# Patient Record
Sex: MALE | Race: WHITE | Employment: FULL TIME | URBAN - METROPOLITAN AREA
[De-identification: names, ages, dates, MRNs, and addresses within clinical notes are randomized per-mention and may not be internally consistent; named-entity substitution may affect disease eponyms.]

---

## 2022-09-08 ENCOUNTER — HOSPITAL ENCOUNTER (EMERGENCY)
Age: 27
Discharge: HOME | End: 2022-09-08
Attending: EMERGENCY MEDICINE

## 2022-09-08 VITALS
BODY MASS INDEX: 26.04 KG/M2 | TEMPERATURE: 98.7 F | WEIGHT: 186 LBS | DIASTOLIC BLOOD PRESSURE: 80 MMHG | OXYGEN SATURATION: 100 % | HEART RATE: 79 BPM | HEIGHT: 71 IN | RESPIRATION RATE: 18 BRPM | SYSTOLIC BLOOD PRESSURE: 130 MMHG

## 2022-09-08 DIAGNOSIS — F41.1 ANXIETY STATE: Primary | ICD-10-CM

## 2022-09-08 PROCEDURE — 99283 EMERGENCY DEPT VISIT LOW MDM: CPT

## 2022-09-08 RX ORDER — HYDROXYZINE PAMOATE 25 MG/1
25 CAPSULE ORAL 3 TIMES DAILY PRN
Qty: 28 CAPSULE | Refills: 0 | Status: SHIPPED | OUTPATIENT
Start: 2022-09-08 | End: 2022-09-22

## 2022-09-08 RX ORDER — HYDROXYZINE PAMOATE 25 MG/1
50 CAPSULE ORAL
Status: DISCONTINUED | OUTPATIENT
Start: 2022-09-08 | End: 2022-09-09 | Stop reason: HOSPADM

## 2022-09-08 ASSESSMENT — PAIN - FUNCTIONAL ASSESSMENT: PAIN_FUNCTIONAL_ASSESSMENT: NONE - DENIES PAIN

## 2022-09-08 ASSESSMENT — ENCOUNTER SYMPTOMS
COUGH: 0
BACK PAIN: 0
ABDOMINAL DISTENTION: 0

## 2022-09-09 NOTE — ED NOTES
I have reviewed discharge instructions with the patient. The patient verbalized understanding. Patient left ED via Discharge Method: ambulatory to Home with friend    Opportunity for questions and clarification provided. Patient given 1 scripts. To continue your aftercare when you leave the hospital, you may receive an automated call from our care team to check in on how you are doing. This is a free service and part of our promise to provide the best care and service to meet your aftercare needs.  If you have questions, or wish to unsubscribe from this service please call [phone]. Thank you for Choosing our [facility] Emergency Department.         Princess Massey RN  09/08/22 [id number]

## 2022-09-09 NOTE — ED TRIAGE NOTES
Pt arrives from home via POV.   States he has felt like his heart is not beating normal.  Has been under a lot of stress recently

## 2022-09-09 NOTE — ED PROVIDER NOTES
[employer] Emergency Department Provider Note                   PCP:                No primary care provider on file. Age: 27 y.o. Sex: male       ICD-10-CM    1. Anxiety state  F41.1           DISPOSITION Decision To Discharge 09/08/2022 10:26:10 PM         No orders of the defined types were placed in this encounter. Vinod Reveles is a 27 y.o. male who presents to the Emergency Department with chief complaint of    Chief Complaint   Patient presents with    Anxiety      41-year-old male with history of anxiety presents this department with chief complaint of worsening anxiety. States he feels like his heart is not beating normally. Reports increased stress lately. He denies any illicit drug use. States today he had a dream where he felt like something was grabbing and squeezing them. He is like he is having irregular heart rate all day today. While awaiting to be states his symptoms have improved. Not associate with any chest pain, shortness of breath, fever, chills, nausea vomit diarrhea. The history is provided by the patient. No  was used. Review of Systems   Constitutional:  Negative for chills and fever. HENT:  Negative for dental problem. Respiratory:  Negative for cough. Cardiovascular:  Positive for palpitations. Gastrointestinal:  Negative for abdominal distention. Musculoskeletal:  Negative for back pain. Neurological:  Negative for headaches. All other systems reviewed and are negative. History reviewed. No pertinent past medical history. History reviewed. No pertinent surgical history. History reviewed. No pertinent family history. Bactrim [sulfamethoxazole-trimethoprim]     Previous Medications    No medications on file        Vitals signs and nursing note reviewed.    Patient Vitals for the past 4 hrs:   Temp Pulse Resp BP SpO2   09/08/22 2128 98.7 °F (37.1 °C) 79 18 (!) 129/96 99 %          Physical

## 2023-01-17 ENCOUNTER — APPOINTMENT (OUTPATIENT)
Dept: GENERAL RADIOLOGY | Age: 28
End: 2023-01-17

## 2023-01-17 ENCOUNTER — HOSPITAL ENCOUNTER (EMERGENCY)
Age: 28
Discharge: HOME | End: 2023-01-17
Attending: EMERGENCY MEDICINE

## 2023-01-17 VITALS
HEART RATE: 68 BPM | TEMPERATURE: 98.1 F | DIASTOLIC BLOOD PRESSURE: 87 MMHG | RESPIRATION RATE: 17 BRPM | OXYGEN SATURATION: 99 % | SYSTOLIC BLOOD PRESSURE: 129 MMHG

## 2023-01-17 DIAGNOSIS — F41.1 ANXIETY STATE: Primary | ICD-10-CM

## 2023-01-17 LAB
ALBUMIN SERPL-MCNC: 4.8 G/DL (ref 3.5–5)
ALBUMIN/GLOB SERPL: 1.3 (ref 0.4–1.6)
ALP SERPL-CCNC: 64 U/L (ref 50–136)
ALT SERPL-CCNC: 39 U/L (ref 12–65)
ANION GAP SERPL CALC-SCNC: 8 MMOL/L (ref 2–11)
AST SERPL-CCNC: 21 U/L (ref 15–37)
BILIRUB SERPL-MCNC: 2.3 MG/DL (ref 0.2–1.1)
BUN SERPL-MCNC: 15 MG/DL (ref 6–23)
CALCIUM SERPL-MCNC: 9.7 MG/DL (ref 8.3–10.4)
CHLORIDE SERPL-SCNC: 104 MMOL/L (ref 101–110)
CO2 SERPL-SCNC: 26 MMOL/L (ref 21–32)
CREAT SERPL-MCNC: 1 MG/DL (ref 0.8–1.5)
ERYTHROCYTE [DISTWIDTH] IN BLOOD BY AUTOMATED COUNT: 12.2 % (ref 11.9–14.6)
GLOBULIN SER CALC-MCNC: 3.8 G/DL (ref 2.8–4.5)
GLUCOSE SERPL-MCNC: 88 MG/DL (ref 65–100)
HCT VFR BLD AUTO: 48.2 % (ref 41.1–50.3)
HGB BLD-MCNC: 16.4 G/DL (ref 13.6–17.2)
MAGNESIUM SERPL-MCNC: 2.1 MG/DL (ref 1.8–2.4)
MCH RBC QN AUTO: 29.6 PG (ref 26.1–32.9)
MCHC RBC AUTO-ENTMCNC: 34 G/DL (ref 31.4–35)
MCV RBC AUTO: 87 FL (ref 82–102)
NRBC # BLD: 0 K/UL (ref 0–0.2)
PLATELET # BLD AUTO: 235 K/UL (ref 150–450)
PMV BLD AUTO: 11.5 FL (ref 9.4–12.3)
POTASSIUM SERPL-SCNC: 3.6 MMOL/L (ref 3.5–5.1)
PROT SERPL-MCNC: 8.6 G/DL (ref 6.3–8.2)
RBC # BLD AUTO: 5.54 M/UL (ref 4.23–5.6)
SODIUM SERPL-SCNC: 138 MMOL/L (ref 133–143)
TROPONIN I SERPL HS-MCNC: 5.1 PG/ML (ref 0–14)
WBC # BLD AUTO: 11.4 K/UL (ref 4.3–11.1)

## 2023-01-17 PROCEDURE — 83735 ASSAY OF MAGNESIUM: CPT

## 2023-01-17 PROCEDURE — 80053 COMPREHEN METABOLIC PANEL: CPT

## 2023-01-17 PROCEDURE — 71046 X-RAY EXAM CHEST 2 VIEWS: CPT

## 2023-01-17 PROCEDURE — 6370000000 HC RX 637 (ALT 250 FOR IP)

## 2023-01-17 PROCEDURE — 2580000003 HC RX 258

## 2023-01-17 PROCEDURE — 99285 EMERGENCY DEPT VISIT HI MDM: CPT

## 2023-01-17 PROCEDURE — 85027 COMPLETE CBC AUTOMATED: CPT

## 2023-01-17 PROCEDURE — 93005 ELECTROCARDIOGRAM TRACING: CPT

## 2023-01-17 PROCEDURE — 84484 ASSAY OF TROPONIN QUANT: CPT

## 2023-01-17 RX ORDER — HYDROXYZINE HYDROCHLORIDE 25 MG/1
25 TABLET, FILM COATED ORAL EVERY 8 HOURS PRN
Qty: 30 TABLET | Refills: 0 | Status: SHIPPED | OUTPATIENT
Start: 2023-01-17

## 2023-01-17 RX ORDER — LORAZEPAM 1 MG/1
1 TABLET ORAL ONCE
Status: COMPLETED | OUTPATIENT
Start: 2023-01-17 | End: 2023-01-17

## 2023-01-17 RX ORDER — 0.9 % SODIUM CHLORIDE 0.9 %
1000 INTRAVENOUS SOLUTION INTRAVENOUS ONCE
Status: COMPLETED | OUTPATIENT
Start: 2023-01-17 | End: 2023-01-17

## 2023-01-17 RX ADMIN — SODIUM CHLORIDE 1000 ML: 9 INJECTION, SOLUTION INTRAVENOUS at 22:55

## 2023-01-17 RX ADMIN — LORAZEPAM 1 MG: 1 TABLET ORAL at 22:55

## 2023-01-17 ASSESSMENT — ENCOUNTER SYMPTOMS
CHEST TIGHTNESS: 1
SHORTNESS OF BREATH: 1

## 2023-01-18 LAB
EKG ATRIAL RATE: 89 BPM
EKG DIAGNOSIS: NORMAL
EKG P AXIS: 61 DEGREES
EKG P-R INTERVAL: 122 MS
EKG Q-T INTERVAL: 360 MS
EKG QRS DURATION: 74 MS
EKG QTC CALCULATION (BAZETT): 438 MS
EKG R AXIS: 35 DEGREES
EKG T AXIS: 46 DEGREES
EKG VENTRICULAR RATE: 89 BPM

## 2023-01-18 NOTE — ED NOTES
I have reviewed discharge instructions with the patient and parent. The patient and parent verbalized understanding. Patient left ED via Discharge Method: ambulatory to Home with (mother). Opportunity for questions and clarification provided. Patient given 1 scripts. To continue your aftercare when you leave the hospital, you may receive an automated call from our care team to check in on how you are doing. This is a free service and part of our promise to provide the best care and service to meet your aftercare needs.  If you have questions, or wish to unsubscribe from this service please call [phone]. Thank you for Choosing our [facility] Emergency Department.        Gaby Desouza RN  01/17/23 [id number]

## 2023-01-18 NOTE — DISCHARGE INSTRUCTIONS
Please use Atarax as needed for anxiety. Please follow-up with primary care. You may use the number listed below to find a primary care doctor. We would love to help you get a primary care doctor for follow-up after your emergency department visit. Please call [phone] between 7AM - 6PM Monday to Friday. A care navigator will be able to assist you with setting up a doctor close to your home.

## 2023-01-18 NOTE — ED PROVIDER NOTES
Emergency Department Provider Note                   PCP:                None Provider               Age: 32 y.o. Sex: male       ICD-10-CM    1. Anxiety state  F41.1           DISPOSITION Decision To Discharge 01/17/2023 10:52:49 PM       Medical Decision Making  77-year-old male presents with self-admitted panic attacks related to his dog being ill. Does endorse chest tightness and palpitations with these episodes. We will check cardiac work-up to include CBC, CMP, troponin, chest x-ray. Problems Addressed:  Anxiety state: acute illness or injury    Amount and/or Complexity of Data Reviewed  External Data Reviewed: labs, radiology and ECG. Labs: ordered. Radiology: ordered. ECG/medicine tests: ordered. Risk  Prescription drug management. Complexity of Problem: 1 acute, uncomplicated illness or injury. (3)    I have conducted an independent ordering and review of Labs. I have conducted an independent ordering and review of EKG. I have conducted an independent ordering and review of X-rays. Patient was discharged risks and benefits of hospitalization were discussed. ED Course as of 01/17/23 2352   Tue Jan 17, 2023 2222 CXR:  IMPRESSION:  No evidence of an acute intrathoracic process [CJ]   2252 CBC, CMP, mag, troponin normal plan to discharge patient with PRN atarax.   [CJ]      ED Course User Index  [CJ] Rene Fraire, ROSANA - CNP        Orders Placed This Encounter   Procedures    XR CHEST (2 VW)    CBC    Comprehensive Metabolic Panel    Troponin    Magnesium    Pulse Oximetry    EKG 12 Lead    Saline lock IV        Medications   0.9 % sodium chloride bolus (0 mLs IntraVENous Stopped 1/17/23 2463)   LORazepam (ATIVAN) tablet 1 mg (1 mg Oral Given 1/17/23 2255)       Discharge Medication List as of 1/17/2023 11:18 PM        START taking these medications    Details   hydrOXYzine HCl (ATARAX) 25 MG tablet Take 1 tablet by mouth every 8 hours as needed for Anxiety, Disp-30 tablet, R-0Print              Perry Thornton is a 32 y.o. male who presents to the Emergency Department with chief complaint of    Chief Complaint   Patient presents with    Anxiety      64-year-old male with no past medical history presents for several episodes of chest tightness with palpitations. He feels this is related to his dog recently being ill. His mother endorses several panic attacks over the last several months and weeks. States that she struggles with anxiety and he often has some a similar symptoms that she knows her self. He has been seen in the past for anxiety and was prescribed Atarax, states that he took it as needed and felt that it is at first however feels that the medication became ineffective and then he ran out. Denies suicidal or homicidal ideation. Does not have primary care. Endorse using alcohol most days up to 5 drinks a day. The history is provided by the patient. Review of Systems   Respiratory:  Positive for chest tightness and shortness of breath. Cardiovascular:  Positive for palpitations. Psychiatric/Behavioral:  The patient is nervous/anxious. All other systems reviewed and are negative. No past medical history on file. No past surgical history on file. No family history on file. Social History     Socioeconomic History    Marital status: Single        Allergies: Bactrim [sulfamethoxazole-trimethoprim]    Discharge Medication List as of 1/17/2023 11:18 PM           Vitals signs and nursing note reviewed. Patient Vitals for the past 4 hrs:   Temp Pulse Resp BP SpO2   01/17/23 2330 98.1 °F (36.7 °C) 68 17 129/87 99 %          Physical Exam  Vitals and nursing note reviewed. Constitutional:       General: He is not in acute distress. Appearance: Normal appearance. He is normal weight. He is not ill-appearing or toxic-appearing. HENT:      Head: Normocephalic and atraumatic.       Right Ear: External ear normal.      Left Ear: External ear normal.      Nose: Nose normal. No congestion or rhinorrhea. Mouth/Throat:      Mouth: Mucous membranes are moist.      Pharynx: No oropharyngeal exudate or posterior oropharyngeal erythema. Eyes:      Extraocular Movements: Extraocular movements intact. Pupils: Pupils are equal, round, and reactive to light. Neck:      Vascular: No carotid bruit. Cardiovascular:      Rate and Rhythm: Normal rate and regular rhythm. Pulses: Normal pulses. Heart sounds: Normal heart sounds. No murmur heard. No friction rub. No gallop. Pulmonary:      Effort: Pulmonary effort is normal. No respiratory distress. Breath sounds: Normal breath sounds. No stridor. No wheezing, rhonchi or rales. Chest:      Chest wall: No tenderness. Abdominal:      General: Abdomen is flat. Palpations: Abdomen is soft. Musculoskeletal:         General: No swelling, tenderness or deformity. Normal range of motion. Cervical back: Normal range of motion and neck supple. No rigidity or tenderness. Lymphadenopathy:      Cervical: No cervical adenopathy. Skin:     General: Skin is warm. Capillary Refill: Capillary refill takes less than 2 seconds. Coloration: Skin is not jaundiced or pale. Findings: No bruising, erythema, lesion or rash. Neurological:      General: No focal deficit present. Mental Status: He is alert and oriented to person, place, and time. Mental status is at baseline. Cranial Nerves: No cranial nerve deficit. Sensory: No sensory deficit. Motor: No weakness. Coordination: Coordination normal.      Gait: Gait normal.   Psychiatric:         Mood and Affect: Mood normal.         Behavior: Behavior normal.         Thought Content:  Thought content normal.         Judgment: Judgment normal.        Procedures    ED EKG Interpretation  EKG was interpreted in the absence of a cardiologist.    Rate: 89  EKG Interpretation: EKG Interpretation: sinus rhythm  ST Segments: Normal ST segments - NO STEMI    Results for orders placed or performed during the hospital encounter of 01/17/23   XR CHEST (2 VW)    Narrative    EXAM: XR CHEST (2 VW)    HISTORY: Chest Pain. TECHNIQUE: Frontal and lateral chest.    COMPARISON: None available. FINDINGS:   The cardiac silhouette, mediastinum, and pulmonary vasculature are within normal  limits. There is no consolidation, pleural effusion, or pneumothorax. No significant osseous abnormalities are observed. Impression    No evidence of an acute intrathoracic process.      CBC   Result Value Ref Range    WBC 11.4 (H) 4.3 - 11.1 K/uL    RBC 5.54 4.23 - 5.6 M/uL    Hemoglobin 16.4 13.6 - 17.2 g/dL    Hematocrit 48.2 41.1 - 50.3 %    MCV 87.0 82 - 102 FL    MCH 29.6 26.1 - 32.9 PG    MCHC 34.0 31.4 - 35.0 g/dL    RDW 12.2 11.9 - 14.6 %    Platelets 292 245 - 491 K/uL    MPV 11.5 9.4 - 12.3 FL    nRBC 0.00 0.0 - 0.2 K/uL   Comprehensive Metabolic Panel   Result Value Ref Range    Sodium 138 133 - 143 mmol/L    Potassium 3.6 3.5 - 5.1 mmol/L    Chloride 104 101 - 110 mmol/L    CO2 26 21 - 32 mmol/L    Anion Gap 8 2 - 11 mmol/L    Glucose 88 65 - 100 mg/dL    BUN 15 6 - 23 MG/DL    Creatinine 1.00 0.8 - 1.5 MG/DL    Est, Glom Filt Rate >60 >60 ml/min/1.73m2    Calcium 9.7 8.3 - 10.4 MG/DL    Total Bilirubin 2.3 (H) 0.2 - 1.1 MG/DL    ALT 39 12 - 65 U/L    AST 21 15 - 37 U/L    Alk Phosphatase 64 50 - 136 U/L    Total Protein 8.6 (H) 6.3 - 8.2 g/dL    Albumin 4.8 3.5 - 5.0 g/dL    Globulin 3.8 2.8 - 4.5 g/dL    Albumin/Globulin Ratio 1.3 0.4 - 1.6     Troponin   Result Value Ref Range    Troponin, High Sensitivity 5.1 0 - 14 pg/mL   Magnesium   Result Value Ref Range    Magnesium 2.1 1.8 - 2.4 mg/dL   EKG 12 Lead   Result Value Ref Range    Ventricular Rate 89 BPM    Atrial Rate 89 BPM    P-R Interval 122 ms    QRS Duration 74 ms    Q-T Interval 360 ms    QTc Calculation (Bazett) 438 ms    P Axis 61 degrees    R Axis 35 degrees    T Axis 46 degrees    Diagnosis Normal sinus rhythm         XR CHEST (2 VW)   Final Result   No evidence of an acute intrathoracic process. Voice dictation software was used during the making of this note. This software is not perfect and grammatical and other typographical errors may be present. This note has not been completely proofread for errors.      Shon Riedel, ROSANA - CNP  01/17/23 0190

## 2023-01-18 NOTE — ED TRIAGE NOTES
Ambulatory with steady gait into triage. Accompanied by mother. Reports anxiety r/t sick dog. Onset of symptoms couple days ago when taking dog to vet. Reports hx of similar symptoms in past r/t stress.  Denies chest pain, n/v/d.

## 2024-11-14 ENCOUNTER — HOSPITAL ENCOUNTER (EMERGENCY)
Age: 29
Discharge: HOME | End: 2024-11-15
Attending: EMERGENCY MEDICINE

## 2024-11-14 DIAGNOSIS — F41.9 ANXIETY: ICD-10-CM

## 2024-11-14 DIAGNOSIS — R00.2 PALPITATIONS: Primary | ICD-10-CM

## 2024-11-14 DIAGNOSIS — R07.89 CHEST DISCOMFORT: ICD-10-CM

## 2024-11-14 LAB
BASOPHILS # BLD: 0.1 K/UL (ref 0–0.2)
BASOPHILS NFR BLD: 0 % (ref 0–2)
DIFFERENTIAL METHOD BLD: ABNORMAL
EOSINOPHIL # BLD: 0 K/UL (ref 0–0.8)
EOSINOPHIL NFR BLD: 0 % (ref 0.5–7.8)
ERYTHROCYTE [DISTWIDTH] IN BLOOD BY AUTOMATED COUNT: 12.4 % (ref 11.9–14.6)
HCT VFR BLD AUTO: 43.2 % (ref 41.1–50.3)
HGB BLD-MCNC: 14.9 G/DL (ref 13.6–17.2)
IMM GRANULOCYTES # BLD AUTO: 0 K/UL (ref 0–0.5)
IMM GRANULOCYTES NFR BLD AUTO: 0 % (ref 0–5)
LYMPHOCYTES # BLD: 1.1 K/UL (ref 0.5–4.6)
LYMPHOCYTES NFR BLD: 8 % (ref 13–44)
MCH RBC QN AUTO: 29.2 PG (ref 26.1–32.9)
MCHC RBC AUTO-ENTMCNC: 34.5 G/DL (ref 31.4–35)
MCV RBC AUTO: 84.7 FL (ref 82–102)
MONOCYTES # BLD: 0.5 K/UL (ref 0.1–1.3)
MONOCYTES NFR BLD: 4 % (ref 4–12)
NEUTS SEG # BLD: 11.4 K/UL (ref 1.7–8.2)
NEUTS SEG NFR BLD: 88 % (ref 43–78)
NRBC # BLD: 0 K/UL (ref 0–0.2)
PLATELET # BLD AUTO: 215 K/UL (ref 150–450)
PMV BLD AUTO: 11.7 FL (ref 9.4–12.3)
RBC # BLD AUTO: 5.1 M/UL (ref 4.23–5.6)
WBC # BLD AUTO: 13.1 K/UL (ref 4.3–11.1)

## 2024-11-14 PROCEDURE — 83735 ASSAY OF MAGNESIUM: CPT

## 2024-11-14 PROCEDURE — 85025 COMPLETE CBC W/AUTO DIFF WBC: CPT

## 2024-11-14 PROCEDURE — 84484 ASSAY OF TROPONIN QUANT: CPT

## 2024-11-14 PROCEDURE — 80053 COMPREHEN METABOLIC PANEL: CPT

## 2024-11-14 PROCEDURE — 99284 EMERGENCY DEPT VISIT MOD MDM: CPT

## 2024-11-14 PROCEDURE — 93005 ELECTROCARDIOGRAM TRACING: CPT | Performed by: EMERGENCY MEDICINE

## 2024-11-14 ASSESSMENT — LIFESTYLE VARIABLES
HOW MANY STANDARD DRINKS CONTAINING ALCOHOL DO YOU HAVE ON A TYPICAL DAY: 1 OR 2
HOW OFTEN DO YOU HAVE A DRINK CONTAINING ALCOHOL: MONTHLY OR LESS

## 2024-11-14 ASSESSMENT — PAIN SCALES - GENERAL: PAINLEVEL_OUTOF10: 0

## 2024-11-15 VITALS
DIASTOLIC BLOOD PRESSURE: 75 MMHG | SYSTOLIC BLOOD PRESSURE: 118 MMHG | RESPIRATION RATE: 18 BRPM | OXYGEN SATURATION: 98 % | TEMPERATURE: 98 F | HEIGHT: 71 IN | BODY MASS INDEX: 23.8 KG/M2 | WEIGHT: 170 LBS | HEART RATE: 61 BPM

## 2024-11-15 LAB
ALBUMIN SERPL-MCNC: 4.3 G/DL (ref 3.5–5)
ALBUMIN/GLOB SERPL: 1.3 (ref 1–1.9)
ALP SERPL-CCNC: 62 U/L (ref 40–129)
ALT SERPL-CCNC: 31 U/L (ref 8–55)
ANION GAP SERPL CALC-SCNC: 11 MMOL/L (ref 7–16)
AST SERPL-CCNC: 26 U/L (ref 15–37)
BILIRUB SERPL-MCNC: 0.5 MG/DL (ref 0–1.2)
BILIRUB UR QL: NEGATIVE
BUN SERPL-MCNC: 17 MG/DL (ref 6–23)
CALCIUM SERPL-MCNC: 9.4 MG/DL (ref 8.8–10.2)
CHLORIDE SERPL-SCNC: 104 MMOL/L (ref 98–107)
CO2 SERPL-SCNC: 26 MMOL/L (ref 20–29)
CREAT SERPL-MCNC: 1.07 MG/DL (ref 0.8–1.3)
EKG ATRIAL RATE: 66 BPM
EKG DIAGNOSIS: NORMAL
EKG P AXIS: 56 DEGREES
EKG P-R INTERVAL: 128 MS
EKG Q-T INTERVAL: 384 MS
EKG QRS DURATION: 76 MS
EKG QTC CALCULATION (BAZETT): 402 MS
EKG R AXIS: 45 DEGREES
EKG T AXIS: 43 DEGREES
EKG VENTRICULAR RATE: 66 BPM
GLOBULIN SER CALC-MCNC: 3.3 G/DL (ref 2.3–3.5)
GLUCOSE SERPL-MCNC: 129 MG/DL (ref 70–99)
GLUCOSE UR QL STRIP.AUTO: NEGATIVE MG/DL
KETONES UR-MCNC: NEGATIVE MG/DL
LEUKOCYTE ESTERASE UR QL STRIP: NEGATIVE
MAGNESIUM SERPL-MCNC: 2.1 MG/DL (ref 1.8–2.4)
NITRITE UR QL: NEGATIVE
PH UR: 7 (ref 5–9)
POTASSIUM SERPL-SCNC: 4.3 MMOL/L (ref 3.5–5.1)
PROT SERPL-MCNC: 7.6 G/DL (ref 6.3–8.2)
PROT UR QL: ABNORMAL MG/DL
RBC # UR STRIP: NEGATIVE
SERVICE CMNT-IMP: ABNORMAL
SODIUM SERPL-SCNC: 140 MMOL/L (ref 136–145)
SP GR UR: 1.02 (ref 1–1.02)
TROPONIN T SERPL HS-MCNC: <6 NG/L (ref 0–22)
UROBILINOGEN UR QL: 1 EU/DL (ref 0.2–1)

## 2024-11-15 PROCEDURE — 81003 URINALYSIS AUTO W/O SCOPE: CPT

## 2024-11-15 PROCEDURE — 93010 ELECTROCARDIOGRAM REPORT: CPT | Performed by: INTERNAL MEDICINE

## 2024-11-15 RX ORDER — BUSPIRONE HYDROCHLORIDE 7.5 MG/1
7.5 TABLET ORAL 2 TIMES DAILY
Qty: 60 TABLET | Refills: 0 | Status: SHIPPED | OUTPATIENT
Start: 2024-11-15 | End: 2024-12-15

## 2024-11-15 RX ORDER — HYDROXYZINE HYDROCHLORIDE 25 MG/1
25 TABLET, FILM COATED ORAL EVERY 8 HOURS PRN
Qty: 30 TABLET | Refills: 0 | Status: SHIPPED | OUTPATIENT
Start: 2024-11-15

## 2024-11-15 ASSESSMENT — PAIN SCALES - GENERAL: PAINLEVEL_OUTOF10: 0

## 2024-11-15 NOTE — ED TRIAGE NOTES
Pt states he had a panic attack after eating this evening, states he had a \"scared feeling\".  Pt has a hx of panic attacks, states he doesn't take anything for these.

## 2024-11-15 NOTE — ED NOTES
Patient mobility status  with no difficulty.     I have reviewed discharge instructions with the patient.  The patient verbalized understanding.    Patient left ED via Discharge Method: ambulatory to Home with Parent.    Opportunity for questions and clarification provided.     Patient given 2 scripts.            Evelina Young RN  11/15/24 0158

## 2024-11-15 NOTE — ED PROVIDER NOTES
Emergency Department Provider Note                   PCP:                Jeri Vale APRN - NP               Age: 28 y.o.      Sex: male   Final diagnosis/impression:  1. Palpitations    2. Anxiety    3. Chest discomfort       Disposition: Discharged    MDM/Clinical Course:  Patient seen by myself at the [facility] emergency department. Patient had signs symptoms and clinical history most consistent withWith what sounds like anxiety/panic attack type symptoms, however we will proceed with additional workup for palpitations, chest discomfort.  Overall well-appearing, no current ongoing symptoms. My independent analysis/interpretation of laboratory work-up here shows CBC shows mild leukocytosis of 13.1 of unclear etiology/significance.  Suspect this could potentially be stress or dehydration related.  CMP is unremarkable but does show mildly elevated glucose at 129, suspect stress related.  Urinalysis performed shows elevated urine specific gravity at 1.025, no glucosuria.  Magnesium is unremarkable.  Troponin testing is unremarkable.  Outpatient prescriptions for hydroxyzine, BuSpar written.  I did recommend follow-up with [facility] clinic.  I did recommend avoidance of any type of caffeinated beverages, stimulant, and similar.  I recommended close monitoring of symptoms, low threshold to return as needed.  Patient/family given instructions to return as needed for any questions, concerns or worsening symptoms, particularly those as outlined in the disposition section / discharge section of patient discharge paperwork. Patient/family verbalizes understanding and agreement with ED course/plan in shared medical decision making. Questions answered.    Complexity of Problems Addressed:  1 or more acute illnesses that pose a threat to life or bodily function.     Data Reviewed and Analyzed:    Category 1:   I ordered each unique test.  I reviewed the results of each unique test.    Category 2:   My

## 2024-11-15 NOTE — DISCHARGE INSTRUCTIONS
Please return for any questions, concerns or worsening symptoms, particularly increasing/unremitting chest pain, fainting episodes, heart palpitations, lightheadedness, shortness of breath.    We do recommend follow-up with a primary care provider for further discussion/evaluation.    For additional reading related to anxiety/panic attacks:  \"Panic Attacks Workbook: A Guided Program for Beating the Panic Trick\" - by Roger Ortega Ph.D  - This book has a purple cover

## 2025-03-11 ENCOUNTER — OFFICE VISIT (OUTPATIENT)
Dept: PRIMARY CARE CLINIC | Facility: CLINIC | Age: 30
End: 2025-03-11

## 2025-03-11 VITALS
WEIGHT: 181 LBS | OXYGEN SATURATION: 99 % | BODY MASS INDEX: 25.34 KG/M2 | HEIGHT: 71 IN | SYSTOLIC BLOOD PRESSURE: 131 MMHG | DIASTOLIC BLOOD PRESSURE: 75 MMHG | HEART RATE: 80 BPM | TEMPERATURE: 99 F

## 2025-03-11 DIAGNOSIS — F41.9 ANXIETY: ICD-10-CM

## 2025-03-11 DIAGNOSIS — Z13.21 SCREENING FOR ENDOCRINE, NUTRITIONAL, METABOLIC AND IMMUNITY DISORDER: ICD-10-CM

## 2025-03-11 DIAGNOSIS — E55.9 VITAMIN D DEFICIENCY: ICD-10-CM

## 2025-03-11 DIAGNOSIS — R53.83 OTHER FATIGUE: ICD-10-CM

## 2025-03-11 DIAGNOSIS — Z13.228 SCREENING FOR ENDOCRINE, NUTRITIONAL, METABOLIC AND IMMUNITY DISORDER: ICD-10-CM

## 2025-03-11 DIAGNOSIS — R73.09 OTHER ABNORMAL GLUCOSE: ICD-10-CM

## 2025-03-11 DIAGNOSIS — R79.9 ABNORMAL BLOOD CHEMISTRY: ICD-10-CM

## 2025-03-11 DIAGNOSIS — Z13.29 SCREENING FOR ENDOCRINE, NUTRITIONAL, METABOLIC AND IMMUNITY DISORDER: ICD-10-CM

## 2025-03-11 DIAGNOSIS — Z13.21 ENCOUNTER FOR VITAMIN DEFICIENCY SCREENING: ICD-10-CM

## 2025-03-11 DIAGNOSIS — Z13.1 SCREENING FOR DIABETES MELLITUS: ICD-10-CM

## 2025-03-11 DIAGNOSIS — Z13.0 SCREENING FOR DEFICIENCY ANEMIA: ICD-10-CM

## 2025-03-11 DIAGNOSIS — Z13.29 SCREENING FOR THYROID DISORDER: ICD-10-CM

## 2025-03-11 DIAGNOSIS — R53.83 MALAISE AND FATIGUE: ICD-10-CM

## 2025-03-11 DIAGNOSIS — Z79.899 ENCOUNTER FOR LONG-TERM (CURRENT) USE OF MEDICATIONS: ICD-10-CM

## 2025-03-11 DIAGNOSIS — Z13.0 SCREENING FOR ENDOCRINE, NUTRITIONAL, METABOLIC AND IMMUNITY DISORDER: ICD-10-CM

## 2025-03-11 DIAGNOSIS — R79.89 OTHER SPECIFIED ABNORMAL FINDINGS OF BLOOD CHEMISTRY: ICD-10-CM

## 2025-03-11 DIAGNOSIS — Z13.220 SCREENING FOR LIPID DISORDERS: ICD-10-CM

## 2025-03-11 DIAGNOSIS — Z00.00 ENCOUNTER FOR MEDICAL EXAMINATION TO ESTABLISH CARE: Primary | ICD-10-CM

## 2025-03-11 DIAGNOSIS — R53.82 CHRONIC FATIGUE: ICD-10-CM

## 2025-03-11 DIAGNOSIS — Z13.228 SCREENING FOR METABOLIC DISORDER: ICD-10-CM

## 2025-03-11 DIAGNOSIS — R53.81 MALAISE: ICD-10-CM

## 2025-03-11 DIAGNOSIS — R53.81 MALAISE AND FATIGUE: ICD-10-CM

## 2025-03-11 LAB
25(OH)D3 SERPL-MCNC: 30.5 NG/ML (ref 30–100)
ALBUMIN SERPL-MCNC: 4.4 G/DL (ref 3.5–5)
ALBUMIN/GLOB SERPL: 1.6 (ref 1–1.9)
ALP SERPL-CCNC: 59 U/L (ref 40–129)
ALT SERPL-CCNC: 35 U/L (ref 8–55)
ANION GAP SERPL CALC-SCNC: 12 MMOL/L (ref 7–16)
AST SERPL-CCNC: 31 U/L (ref 15–37)
BASOPHILS # BLD: 0.04 K/UL (ref 0–0.2)
BASOPHILS NFR BLD: 0.6 % (ref 0–2)
BILIRUB SERPL-MCNC: 1 MG/DL (ref 0–1.2)
BUN SERPL-MCNC: 10 MG/DL (ref 6–23)
CALCIUM SERPL-MCNC: 9.3 MG/DL (ref 8.8–10.2)
CHLORIDE SERPL-SCNC: 103 MMOL/L (ref 98–107)
CHOLEST SERPL-MCNC: 133 MG/DL (ref 0–200)
CO2 SERPL-SCNC: 25 MMOL/L (ref 20–29)
CREAT SERPL-MCNC: 0.93 MG/DL (ref 0.8–1.3)
DIFFERENTIAL METHOD BLD: ABNORMAL
EOSINOPHIL # BLD: 0.06 K/UL (ref 0–0.8)
EOSINOPHIL NFR BLD: 0.9 % (ref 0.5–7.8)
ERYTHROCYTE [DISTWIDTH] IN BLOOD BY AUTOMATED COUNT: 12.6 % (ref 11.9–14.6)
EST. AVERAGE GLUCOSE BLD GHB EST-MCNC: 104 MG/DL
GLOBULIN SER CALC-MCNC: 2.8 G/DL (ref 2.3–3.5)
GLUCOSE SERPL-MCNC: 95 MG/DL (ref 70–99)
HBA1C MFR BLD: 5.3 % (ref 0–5.6)
HCT VFR BLD AUTO: 44.2 % (ref 41.1–50.3)
HDLC SERPL-MCNC: 34 MG/DL (ref 40–60)
HDLC SERPL: 4 (ref 0–5)
HGB BLD-MCNC: 15.1 G/DL (ref 13.6–17.2)
IMM GRANULOCYTES # BLD AUTO: 0.02 K/UL (ref 0–0.5)
IMM GRANULOCYTES NFR BLD AUTO: 0.3 % (ref 0–5)
LDLC SERPL CALC-MCNC: 93 MG/DL (ref 0–100)
LYMPHOCYTES # BLD: 1.81 K/UL (ref 0.5–4.6)
LYMPHOCYTES NFR BLD: 26.4 % (ref 13–44)
MCH RBC QN AUTO: 29.4 PG (ref 26.1–32.9)
MCHC RBC AUTO-ENTMCNC: 34.2 G/DL (ref 31.4–35)
MCV RBC AUTO: 86.2 FL (ref 82–102)
MONOCYTES # BLD: 0.52 K/UL (ref 0.1–1.3)
MONOCYTES NFR BLD: 7.6 % (ref 4–12)
NEUTS SEG # BLD: 4.4 K/UL (ref 1.7–8.2)
NEUTS SEG NFR BLD: 64.2 % (ref 43–78)
NRBC # BLD: 0 K/UL (ref 0–0.2)
PLATELET # BLD AUTO: 211 K/UL (ref 150–450)
PMV BLD AUTO: 12.9 FL (ref 9.4–12.3)
POTASSIUM SERPL-SCNC: 4.3 MMOL/L (ref 3.5–5.1)
PROT SERPL-MCNC: 7.2 G/DL (ref 6.3–8.2)
RBC # BLD AUTO: 5.13 M/UL (ref 4.23–5.6)
SODIUM SERPL-SCNC: 140 MMOL/L (ref 136–145)
TRIGL SERPL-MCNC: 30 MG/DL (ref 0–150)
TSH W FREE THYROID IF ABNORMAL: 1.5 UIU/ML (ref 0.27–4.2)
VIT B12 SERPL-MCNC: 719 PG/ML (ref 193–986)
VLDLC SERPL CALC-MCNC: 6 MG/DL (ref 6–23)
WBC # BLD AUTO: 6.9 K/UL (ref 4.3–11.1)

## 2025-03-11 PROCEDURE — 99203 OFFICE O/P NEW LOW 30 MIN: CPT | Performed by: NURSE PRACTITIONER

## 2025-03-11 RX ORDER — BUSPIRONE HYDROCHLORIDE 5 MG/1
5 TABLET ORAL 2 TIMES DAILY
Qty: 60 TABLET | Refills: 5 | Status: SHIPPED | OUTPATIENT
Start: 2025-03-11 | End: 2025-09-07

## 2025-03-11 RX ORDER — HYDROXYZINE HYDROCHLORIDE 25 MG/1
25 TABLET, FILM COATED ORAL EVERY 8 HOURS PRN
Qty: 90 TABLET | Refills: 2 | Status: SHIPPED | OUTPATIENT
Start: 2025-03-11 | End: 2025-09-07

## 2025-03-11 SDOH — ECONOMIC STABILITY: FOOD INSECURITY: WITHIN THE PAST 12 MONTHS, YOU WORRIED THAT YOUR FOOD WOULD RUN OUT BEFORE YOU GOT MONEY TO BUY MORE.: NEVER TRUE

## 2025-03-11 SDOH — ECONOMIC STABILITY: FOOD INSECURITY: WITHIN THE PAST 12 MONTHS, THE FOOD YOU BOUGHT JUST DIDN'T LAST AND YOU DIDN'T HAVE MONEY TO GET MORE.: NEVER TRUE

## 2025-03-11 ASSESSMENT — ENCOUNTER SYMPTOMS
EYES NEGATIVE: 1
ABDOMINAL PAIN: 0
ALLERGIC/IMMUNOLOGIC NEGATIVE: 1
RESPIRATORY NEGATIVE: 1
ABDOMINAL DISTENTION: 0

## 2025-03-11 ASSESSMENT — PATIENT HEALTH QUESTIONNAIRE - PHQ9
SUM OF ALL RESPONSES TO PHQ QUESTIONS 1-9: 2
SUM OF ALL RESPONSES TO PHQ QUESTIONS 1-9: 2
1. LITTLE INTEREST OR PLEASURE IN DOING THINGS: SEVERAL DAYS
2. FEELING DOWN, DEPRESSED OR HOPELESS: SEVERAL DAYS
SUM OF ALL RESPONSES TO PHQ QUESTIONS 1-9: 2
SUM OF ALL RESPONSES TO PHQ QUESTIONS 1-9: 2

## 2025-03-11 NOTE — ASSESSMENT & PLAN NOTE
Start Buspar 5 mg PO BID  Start Hydroxyzine 25 mg PO TID PRN    Stable. Chronic   Continue current tx plan

## 2025-03-12 ENCOUNTER — RESULTS FOLLOW-UP (OUTPATIENT)
Dept: PRIMARY CARE CLINIC | Facility: CLINIC | Age: 30
End: 2025-03-12

## 2025-03-12 NOTE — RESULT ENCOUNTER NOTE
Please let the patient know:    Lab Results Summary - 3/11/2025    Most of your lab results are within the normal range, which is great! Below is a breakdown of your key findings:  + Normal Results  + Blood Count: All levels of red blood cells, white blood cells, and platelets are within the normal range, indicating no signs of infection, anemia, or other concerns.  + Kidney & Liver Function: Creatinine, BUN (kidney function), ALT, AST, and bilirubin (liver function) are all within normal limits.  + Electrolytes: Sodium, potassium, chloride, and CO2 levels are all normal, which means your body is maintaining good balance.  + Blood Sugar & Diabetes Screening: Your Hemoglobin A1C is 5.3%, meaning your blood sugar control is in a healthy range.  + Vitamin Levels: Vitamin B12 (719 pg/mL) and Vitamin D (30.5 ng/mL) are within normal levels.    + Areas for Improvement  + Slightly Low \"Good\" Cholesterol (HDL) - 34 mg/dL (Normal: 40-60 mg/dL)  · HDL is the \"good\" cholesterol that helps protect against heart disease.  · What You Can Do:  º Increase heart-healthy fats (e.g., olive oil, nuts, fatty fish like salmon).  º Regular aerobic exercise (brisk walking, jogging, cycling) can help boost HDL.  º If you smoke, quitting can significantly improve HDL.  · Medication Consideration: Not needed at this time, but we will monitor in future labs.  + High Mean Platelet Volume (MPV) - 12.9 (Normal: 9.4-12.3)  · MPV reflects platelet activity and size. A slightly high MPV is usually not concerning, but we'll keep an eye on it in future tests.    Next Steps & Recommendations  + Continue your current medications and supplements as prescribed.  + Focus on increasing HDL (\"good\" cholesterol) with healthy fats and exercise.  + Repeat labs in 6-12 months to monitor cholesterol and platelet markers.  + If new symptoms develop (fatigue, weakness, unusual bruising), reach out for follow-up.      [employer] message sent as well    Explanatory